# Patient Record
Sex: MALE
[De-identification: names, ages, dates, MRNs, and addresses within clinical notes are randomized per-mention and may not be internally consistent; named-entity substitution may affect disease eponyms.]

---

## 2023-06-22 PROBLEM — Z00.00 ENCOUNTER FOR PREVENTIVE HEALTH EXAMINATION: Status: ACTIVE | Noted: 2023-06-22

## 2023-08-25 ENCOUNTER — APPOINTMENT (OUTPATIENT)
Dept: UROLOGY | Facility: CLINIC | Age: 43
End: 2023-08-25
Payer: COMMERCIAL

## 2023-08-25 VITALS
SYSTOLIC BLOOD PRESSURE: 128 MMHG | WEIGHT: 154 LBS | DIASTOLIC BLOOD PRESSURE: 81 MMHG | BODY MASS INDEX: 21.56 KG/M2 | OXYGEN SATURATION: 98 % | HEIGHT: 71 IN | HEART RATE: 95 BPM | TEMPERATURE: 97.2 F

## 2023-08-25 DIAGNOSIS — Z78.9 OTHER SPECIFIED HEALTH STATUS: ICD-10-CM

## 2023-08-25 PROCEDURE — 99203 OFFICE O/P NEW LOW 30 MIN: CPT

## 2023-08-25 NOTE — ASSESSMENT
[FreeTextEntry1] : Assessment: 42M with ED  Plan: -Total testosterone -Tadalafil 5 mg PO QD PRN - The risks/potential side effects were discussed in detail. These include headache, dizziness, dyspepsia, back pain, myalgia, nasal congestion, flushing, vision changes, and prolonged erection. He was informed that this medication could potentiate the effects of high blood pressure medications resulting in dangerously low blood pressure. He should stop the medication and contact medical help if any issues or concerns with treatment. He denies prior history of cardiac disease, chest pain, or use of nitrates.  -Follow up 3 months or sooner as needed

## 2023-08-25 NOTE — HISTORY OF PRESENT ILLNESS
[FreeTextEntry1] : Name ROYCE DOW  MRN [id number]   Sep 13 1980  -------------------------------------------------------------------------------------------------------------------------------------------  Date of First Visit:  2023   Referring Provider/PCP: *****  -------------------------------------------------------------------------------------------------------------------------------------------  CC: erectile dysfunction  History of Present Illness: ROYCE DOW is a 42-year-old male who presents to [facility] care. He has some concerns regarding erections. Is typically able to achieve an erection, but about 20% of the time he is unable to maintain it. Was prescribed 5mg of Cialis by a doctor in [place] that he did not take. He is interested in having testosterone checked today as he has noticed decreased energy. Takes supplements for general health and was told one of them increases testosterone but he is not sure what he takes. States he was diagnosed with a right sided varicocele a few years ago on exam by another provider. He experiences minimal right sided testicular pain about once every 2 months. Not bothered by pain. Does not have children and is not interested in having children. Experiences urinary frequency only with increase fluid intake. Nocturia 1x/night. No urgency, dysuria, hematuria. Overall he is not very bothered by any of his symptoms. Father had kidney stones. No personal history of kidney stones. No personal or family history of  malignancies

## 2023-08-28 ENCOUNTER — NON-APPOINTMENT (OUTPATIENT)
Age: 43
End: 2023-08-28

## 2023-08-28 LAB — TESTOST SERPL-MCNC: 285 NG/DL

## 2023-09-18 ENCOUNTER — NON-APPOINTMENT (OUTPATIENT)
Age: 43
End: 2023-09-18

## 2023-09-18 LAB — TESTOST SERPL-MCNC: 340 NG/DL

## 2023-12-29 ENCOUNTER — APPOINTMENT (OUTPATIENT)
Dept: UROLOGY | Facility: CLINIC | Age: 43
End: 2023-12-29
Payer: COMMERCIAL

## 2023-12-29 VITALS
HEIGHT: 71 IN | DIASTOLIC BLOOD PRESSURE: 63 MMHG | HEART RATE: 74 BPM | WEIGHT: 154 LBS | TEMPERATURE: 97.2 F | BODY MASS INDEX: 21.56 KG/M2 | OXYGEN SATURATION: 97 % | SYSTOLIC BLOOD PRESSURE: 108 MMHG

## 2023-12-29 PROCEDURE — 99214 OFFICE O/P EST MOD 30 MIN: CPT

## 2023-12-29 NOTE — ASSESSMENT
[FreeTextEntry1] : Assessment: 42M with ED and low energy.  Testosterone testing suggestive of borderline hypogonadism.  Plan: -Continue Tadalafil 5 mg PO QD PRN -Total and free testosterone, estradiol, FSH, LH, prolactin, SHBG -GREY to Dr. Velez for further evaluation and management

## 2023-12-29 NOTE — HISTORY OF PRESENT ILLNESS
[FreeTextEntry1] : Name ROYCE DOW MRN [id number]  Sep 13 1980 ------------------------------------------------------------------------------------------------------------------------------------------- Date of First Visit: 2023 Referring Provider/PCP: ***** ------------------------------------------------------------------------------------------------------------------------------------------- CC: erectile dysfunction  History of Present Illness: ROYCE DOW is a 42-year-old male who presents to [facility] care. He has some concerns regarding erections. Is typically able to achieve an erection, but about 20% of the time he is unable to maintain it. Was prescribed 5mg of Cialis by a doctor in [place] that he did not take. He is interested in having testosterone checked today as he has noticed decreased energy. Takes supplements for general health and was told one of them increases testosterone but he is not sure what he takes. States he was diagnosed with a right sided varicocele a few years ago on exam by another provider. He experiences minimal right sided testicular pain about once every 2 months. Not bothered by pain. Does not have children and is not interested in having children. Experiences urinary frequency only with increase fluid intake. Nocturia 1x/night. No urgency, dysuria, hematuria. Overall he is not very bothered by any of his symptoms. Father had kidney stones. No personal history of kidney stones. No personal or family history of  malignancies  ------------------------------------------------------------------------------------------------------------------------------------------- Interval History (2023): At last visit, Aug 2023: T 285 (2:30pm), repeat 345 (9am). Continues to use Tadalafil 5 mg PRN for ED, which works very well for him.  Occasionally breaks pills in half to 2.5 mg as needed.  Continues to also endorse low energy.

## 2024-01-02 ENCOUNTER — NON-APPOINTMENT (OUTPATIENT)
Age: 44
End: 2024-01-02

## 2024-01-02 LAB
FSH SERPL-MCNC: 3.1 IU/L
LH SERPL-ACNC: 6.5 IU/L
PROLACTIN SERPL-MCNC: 13.5 NG/ML
SHBG SERPL-SCNC: 18.7 NMOL/L

## 2024-01-02 RX ORDER — TADALAFIL 5 MG/1
5 TABLET ORAL DAILY
Qty: 15 | Refills: 5 | Status: ACTIVE | COMMUNITY
Start: 2023-08-25 | End: 1900-01-01

## 2024-01-03 LAB
TESTOST FREE SERPL-MCNC: 9.4 PG/ML
TESTOST SERPL-MCNC: 316 NG/DL

## 2024-01-04 LAB — ESTROGEN SERPL-MCNC: 63 PG/ML

## 2024-02-27 ENCOUNTER — APPOINTMENT (OUTPATIENT)
Dept: UROLOGY | Facility: CLINIC | Age: 44
End: 2024-02-27

## 2024-05-31 ENCOUNTER — APPOINTMENT (OUTPATIENT)
Dept: UROLOGY | Facility: CLINIC | Age: 44
End: 2024-05-31

## 2024-06-04 ENCOUNTER — APPOINTMENT (OUTPATIENT)
Dept: UROLOGY | Facility: CLINIC | Age: 44
End: 2024-06-04
Payer: COMMERCIAL

## 2024-06-04 VITALS
OXYGEN SATURATION: 97 % | TEMPERATURE: 97.7 F | WEIGHT: 157 LBS | BODY MASS INDEX: 22.48 KG/M2 | HEART RATE: 82 BPM | HEIGHT: 70 IN | DIASTOLIC BLOOD PRESSURE: 76 MMHG | SYSTOLIC BLOOD PRESSURE: 113 MMHG

## 2024-06-04 DIAGNOSIS — Z82.49 FAMILY HISTORY OF ISCHEMIC HEART DISEASE AND OTHER DISEASES OF THE CIRCULATORY SYSTEM: ICD-10-CM

## 2024-06-04 DIAGNOSIS — I86.1 SCROTAL VARICES: ICD-10-CM

## 2024-06-04 DIAGNOSIS — Z72.0 TOBACCO USE: ICD-10-CM

## 2024-06-04 DIAGNOSIS — E29.1 TESTICULAR HYPOFUNCTION: ICD-10-CM

## 2024-06-04 DIAGNOSIS — N52.9 MALE ERECTILE DYSFUNCTION, UNSPECIFIED: ICD-10-CM

## 2024-06-04 DIAGNOSIS — Z83.3 FAMILY HISTORY OF DIABETES MELLITUS: ICD-10-CM

## 2024-06-04 LAB
TESTOST FREE SERPL-MCNC: 7.8
TESTOST SERPL-MCNC: 264

## 2024-06-04 PROCEDURE — G2211 COMPLEX E/M VISIT ADD ON: CPT | Mod: NC,1L

## 2024-06-04 PROCEDURE — 76870 US EXAM SCROTUM: CPT

## 2024-06-04 PROCEDURE — 99214 OFFICE O/P EST MOD 30 MIN: CPT

## 2024-06-04 RX ORDER — CLOMIPHENE CITRATE 50 MG/1
50 TABLET ORAL
Qty: 30 | Refills: 0 | Status: ACTIVE | COMMUNITY
Start: 2024-06-04 | End: 1900-01-01

## 2024-06-04 RX ORDER — EMTRICITABINE AND TENOFOVIR ALAFENAMIDE 200; 25 MG/1; MG/1
200-25 TABLET ORAL
Refills: 0 | Status: ACTIVE | COMMUNITY

## 2024-06-04 NOTE — HISTORY OF PRESENT ILLNESS
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  Patient is single Bisexual  one partner at this time x 3 months complaining of difficulty maintaining erection and increasing fatigue seen recently at [facility] and low normal T  264 ([id number]) free T 7.8 ( 6.8-21.8) 5/22/2024   Previous notes indicate that the patient complained of erectile dysfunction.  He stated that 20% of the time he was unable to maintain erection.  He had been prescribed 5 mg of Cialis by a doctor in [place] but did not take this. He was concerned regarding his testosterone levels.  He stated that he been taking food supplements to increase his testosterone. He was diagnosed as having a right-sided varicocele previously complains of occasional discomfort.  A testosterone level attained in December 2023 revealed a total testosterone of 316 with a free testosterone of 9.4.  Sex hormone binding globulin was 18.7 which is low normal.  Prolactin was normal as was LH and FSH estrogens were normal at 63 should be noted prior testosterone measured 340 several months earlier and additional testosterone measured 285  Patient takes cialis 5 mg  effective and very responsive taking 2.5 mg very effective

## 2024-06-04 NOTE — ASSESSMENT
[FreeTextEntry1] : Mr. Bravo is a 43-year-old bisexual  who presents with a complaint of inability to maintain erection without the utilization of tadalafil.  He states that the tadalafil is very effective and he takes 2.5 mg with excellent response.  He also complains of decreased energy.  His testosterone levels been in the low range of normal on multiple occasions.  The findings of a varicocele were discussed with the patient.  Discussed indications for varicocelectomy: 1, fertility, 2. ? developement of hypogonadism - discussed controversial 3. Cosmesis 4. Pain  Discussed low normal T levels Discussed symptoms of hypogonadism  Options: 1. continue tadalafil 2. consider clomid I discussed Clomid physiologic basis with ROYCE DOW  We discussed the utilization of Clomid in the treatment of his primary complaint We discussed potential side effects of Clomid  in relation to physiologic effects and adverse effects. We specifically reviewed  acne, hair loss, gynecomastia, visual disturbance, signs and symptoms of "puberty" We discussed that utilization and the fact the this is not an FDA approved utilization. The dose regimen was reviewed. 1/2 tablet qd We discussed the need to monitor the physiologic hormonal response. We discussed repeat labs and followup thereafter.  Plan clomid 25 mg daily labs in 3 weeks  fu in 4 weeks

## 2024-06-04 NOTE — PHYSICAL EXAM
[Urethral Meatus] : meatus normal [Penis Abnormality] : normal circumcised penis [Epididymis] : the epididymides were normal [Testes Tenderness] : no tenderness of the testes [Testes Mass (___cm)] : there were no testicular masses [de-identified] : ROYCE ARRIOLARODRIGUEZ was offered to have a chaperone present  and declined.  [de-identified] : left varicocele

## 2024-07-23 ENCOUNTER — APPOINTMENT (OUTPATIENT)
Dept: UROLOGY | Facility: CLINIC | Age: 44
End: 2024-07-23

## 2024-07-23 NOTE — PHYSICAL EXAM
[Urethral Meatus] : meatus normal [Penis Abnormality] : normal circumcised penis [Epididymis] : the epididymides were normal [Testes Tenderness] : no tenderness of the testes [Testes Mass (___cm)] : there were no testicular masses [de-identified] : ROYCE ARRIOLARODRIGUEZ was offered to have a chaperone present  and declined.  [de-identified] : left varicocele [facility] Pregnancy And Lactation Text: This medication has not been assigned a Pregnancy Risk Category. It is unknown if the medication is excreted in breast milk.

## 2024-07-23 NOTE — HISTORY OF PRESENT ILLNESS
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  Patient is single Bisexual  one partner at this time x 3 months complaining of difficulty maintaining erection and increasing fatigue seen recently at [facility] [place] and low normal T  264 ([phone]) free T 7.8 ( 6.8-21.8) 5/22/2024   Previous notes indicate that the patient complained of erectile dysfunction.  He stated that 20% of the time he was unable to maintain erection.  He had been prescribed 5 mg of Cialis by a doctor in [place] but did not take this. He was concerned regarding his testosterone levels.  He stated that he been taking food supplements to increase his testosterone. He was diagnosed as having a right-sided varicocele previously complains of occasional discomfort.  A testosterone level attained in December 2023 revealed a total testosterone of 316 with a free testosterone of 9.4.  Sex hormone binding globulin was 18.7 which is low normal.  Prolactin was normal as was LH and FSH estrogens were normal at 63 should be noted prior testosterone measured 340 several months earlier and additional testosterone measured 285  Patient takes cialis 5 mg  effective and very responsive taking 2.5 mg very effective

## 2024-12-16 ENCOUNTER — TRANSCRIPTION ENCOUNTER (OUTPATIENT)
Age: 44
End: 2024-12-16

## 2024-12-16 ENCOUNTER — APPOINTMENT (OUTPATIENT)
Dept: UROLOGY | Facility: CLINIC | Age: 44
End: 2024-12-16
Payer: COMMERCIAL

## 2024-12-16 DIAGNOSIS — E29.1 TESTICULAR HYPOFUNCTION: ICD-10-CM

## 2024-12-16 DIAGNOSIS — N52.9 MALE ERECTILE DYSFUNCTION, UNSPECIFIED: ICD-10-CM

## 2024-12-16 PROCEDURE — 99214 OFFICE O/P EST MOD 30 MIN: CPT

## 2024-12-17 RX ORDER — TADALAFIL 5 MG/1
5 TABLET ORAL
Qty: 30 | Refills: 2 | Status: ACTIVE | COMMUNITY
Start: 2024-12-16 | End: 1900-01-01

## 2025-04-29 ENCOUNTER — NON-APPOINTMENT (OUTPATIENT)
Age: 45
End: 2025-04-29

## 2025-05-08 ENCOUNTER — NON-APPOINTMENT (OUTPATIENT)
Age: 45
End: 2025-05-08

## 2025-05-08 ENCOUNTER — LABORATORY RESULT (OUTPATIENT)
Age: 45
End: 2025-05-08

## 2025-05-09 ENCOUNTER — APPOINTMENT (OUTPATIENT)
Dept: UROLOGY | Facility: CLINIC | Age: 45
End: 2025-05-09
Payer: COMMERCIAL

## 2025-05-09 ENCOUNTER — NON-APPOINTMENT (OUTPATIENT)
Age: 45
End: 2025-05-09

## 2025-05-09 VITALS
HEART RATE: 83 BPM | DIASTOLIC BLOOD PRESSURE: 77 MMHG | SYSTOLIC BLOOD PRESSURE: 118 MMHG | TEMPERATURE: 97.8 F | OXYGEN SATURATION: 97 %

## 2025-05-09 PROCEDURE — 76870 US EXAM SCROTUM: CPT

## 2025-05-09 PROCEDURE — 99213 OFFICE O/P EST LOW 20 MIN: CPT

## 2025-05-14 ENCOUNTER — APPOINTMENT (OUTPATIENT)
Dept: UROLOGY | Facility: CLINIC | Age: 45
End: 2025-05-14
Payer: COMMERCIAL

## 2025-05-14 VITALS
OXYGEN SATURATION: 97 % | HEIGHT: 70 IN | HEART RATE: 83 BPM | DIASTOLIC BLOOD PRESSURE: 71 MMHG | TEMPERATURE: 97.2 F | SYSTOLIC BLOOD PRESSURE: 106 MMHG

## 2025-05-14 DIAGNOSIS — E29.1 TESTICULAR HYPOFUNCTION: ICD-10-CM

## 2025-05-14 DIAGNOSIS — N52.9 MALE ERECTILE DYSFUNCTION, UNSPECIFIED: ICD-10-CM

## 2025-05-14 DIAGNOSIS — I86.1 SCROTAL VARICES: ICD-10-CM

## 2025-05-14 PROCEDURE — 99214 OFFICE O/P EST MOD 30 MIN: CPT

## 2025-05-14 RX ORDER — CLOMIPHENE CITRTAE 50 MG/1
50 TABLET ORAL
Qty: 15 | Refills: 3 | Status: ACTIVE | COMMUNITY
Start: 2025-05-14 | End: 1900-01-01

## 2025-06-16 ENCOUNTER — APPOINTMENT (OUTPATIENT)
Dept: UROLOGY | Facility: CLINIC | Age: 45
End: 2025-06-16

## 2025-06-27 LAB
ESTRADIOL SERPL-MCNC: 43 PG/ML
FSH SERPL-MCNC: 3.5 IU/L
HCT VFR BLD CALC: 43.6 %
HGB BLD-MCNC: 13.7 G/DL
LH SERPL-ACNC: 7.4 IU/L
TESTOST SERPL-MCNC: 398 NG/DL

## 2025-07-03 ENCOUNTER — APPOINTMENT (OUTPATIENT)
Dept: UROLOGY | Facility: CLINIC | Age: 45
End: 2025-07-03
Payer: COMMERCIAL

## 2025-07-03 VITALS
TEMPERATURE: 97 F | DIASTOLIC BLOOD PRESSURE: 77 MMHG | OXYGEN SATURATION: 97 % | HEART RATE: 81 BPM | SYSTOLIC BLOOD PRESSURE: 121 MMHG

## 2025-07-03 PROCEDURE — 99214 OFFICE O/P EST MOD 30 MIN: CPT

## 2025-07-03 RX ORDER — ANASTROZOLE TABLETS 1 MG/1
1 TABLET ORAL
Qty: 30 | Refills: 2 | Status: ACTIVE | COMMUNITY
Start: 2025-07-03 | End: 1900-01-01

## 2025-07-26 LAB — ESTRADIOL SERPL-MCNC: 32 PG/ML

## 2025-08-01 ENCOUNTER — APPOINTMENT (OUTPATIENT)
Dept: UROLOGY | Facility: CLINIC | Age: 45
End: 2025-08-01
Payer: COMMERCIAL

## 2025-08-01 VITALS
TEMPERATURE: 97.34 F | SYSTOLIC BLOOD PRESSURE: 117 MMHG | OXYGEN SATURATION: 98 % | DIASTOLIC BLOOD PRESSURE: 74 MMHG | HEART RATE: 88 BPM

## 2025-08-01 DIAGNOSIS — N52.9 MALE ERECTILE DYSFUNCTION, UNSPECIFIED: ICD-10-CM

## 2025-08-01 DIAGNOSIS — I86.1 SCROTAL VARICES: ICD-10-CM

## 2025-08-01 DIAGNOSIS — E28.0 ESTROGEN EXCESS: ICD-10-CM

## 2025-08-01 DIAGNOSIS — E29.1 TESTICULAR HYPOFUNCTION: ICD-10-CM

## 2025-08-01 PROCEDURE — 99214 OFFICE O/P EST MOD 30 MIN: CPT

## 2025-08-01 RX ORDER — CLOMIPHENE CITRTAE 50 MG/1
50 TABLET ORAL
Qty: 30 | Refills: 0 | Status: ACTIVE | COMMUNITY
Start: 2025-08-01 | End: 1900-01-01

## 2025-08-01 RX ORDER — ANASTROZOLE TABLETS 1 MG/1
1 TABLET ORAL
Qty: 30 | Refills: 9 | Status: ACTIVE | COMMUNITY
Start: 2025-08-01 | End: 1900-01-01

## 2025-08-28 ENCOUNTER — APPOINTMENT (OUTPATIENT)
Dept: UROLOGY | Facility: CLINIC | Age: 45
End: 2025-08-28

## 2025-09-09 ENCOUNTER — APPOINTMENT (OUTPATIENT)
Dept: UROLOGY | Facility: CLINIC | Age: 45
End: 2025-09-09
Payer: COMMERCIAL

## 2025-09-09 ENCOUNTER — NON-APPOINTMENT (OUTPATIENT)
Age: 45
End: 2025-09-09

## 2025-09-09 DIAGNOSIS — E28.0 ESTROGEN EXCESS: ICD-10-CM

## 2025-09-09 DIAGNOSIS — E29.1 TESTICULAR HYPOFUNCTION: ICD-10-CM

## 2025-09-09 PROCEDURE — 99213 OFFICE O/P EST LOW 20 MIN: CPT | Mod: 95
